# Patient Record
Sex: MALE | Race: WHITE | URBAN - METROPOLITAN AREA
[De-identification: names, ages, dates, MRNs, and addresses within clinical notes are randomized per-mention and may not be internally consistent; named-entity substitution may affect disease eponyms.]

---

## 2019-06-04 ENCOUNTER — IMPORTED ENCOUNTER (OUTPATIENT)
Dept: URBAN - METROPOLITAN AREA CLINIC 50 | Facility: CLINIC | Age: 79
End: 2019-06-04

## 2019-06-06 ENCOUNTER — IMPORTED ENCOUNTER (OUTPATIENT)
Dept: URBAN - METROPOLITAN AREA CLINIC 50 | Facility: CLINIC | Age: 79
End: 2019-06-06

## 2019-08-06 ENCOUNTER — IMPORTED ENCOUNTER (OUTPATIENT)
Dept: URBAN - METROPOLITAN AREA CLINIC 50 | Facility: CLINIC | Age: 79
End: 2019-08-06

## 2019-08-09 ENCOUNTER — IMPORTED ENCOUNTER (OUTPATIENT)
Dept: URBAN - METROPOLITAN AREA CLINIC 50 | Facility: CLINIC | Age: 79
End: 2019-08-09

## 2019-08-09 NOTE — PATIENT DISCUSSION
"""[place]aco with IOL OD: 08/15/2019 Sensar AAB00 20.5 [place] [facility] [place] [facility] [place]

## 2019-08-15 ENCOUNTER — IMPORTED ENCOUNTER (OUTPATIENT)
Dept: URBAN - METROPOLITAN AREA CLINIC 50 | Facility: CLINIC | Age: 79
End: 2019-08-15

## 2019-08-15 NOTE — PATIENT DISCUSSION
"""S/P IOL OD: Sensar AAB00 20.5 (Target: [place]) +Omidria. Continue post operative instructions and drops per schedule.  """

## 2019-08-21 ENCOUNTER — IMPORTED ENCOUNTER (OUTPATIENT)
Dept: URBAN - METROPOLITAN AREA CLINIC 50 | Facility: CLINIC | Age: 79
End: 2019-08-21

## 2019-08-21 NOTE — PATIENT DISCUSSION
"""S/P IOL OD: Sensar AAB00 20.5 (Target: [place]) +Omidria. Continue post operative instructions and drops per schedule.  """

## 2019-08-30 ENCOUNTER — IMPORTED ENCOUNTER (OUTPATIENT)
Dept: URBAN - METROPOLITAN AREA CLINIC 50 | Facility: CLINIC | Age: 79
End: 2019-08-30

## 2019-08-30 NOTE — PATIENT DISCUSSION
"""S/P IOL OS: Sensar AAB00 20.5 (Target: [place]) +Omidria. Continue post operative instructions and drops per schedule.  """

## 2019-09-17 ENCOUNTER — IMPORTED ENCOUNTER (OUTPATIENT)
Dept: URBAN - METROPOLITAN AREA CLINIC 50 | Facility: CLINIC | Age: 79
End: 2019-09-17

## 2019-09-17 NOTE — PATIENT DISCUSSION
"""S/P IOL OS: Sensar AAB00 20.5 (Target: [place]) +Omidria. Continue post operative instructions and drops per schedule.  """

## 2019-09-25 NOTE — PATIENT DISCUSSION
Monitor for changes. Updated CLRx given today. Advised patient to call our office with decreased vision or increased symptoms.

## 2019-09-25 NOTE — PATIENT DISCUSSION
TYPE OF SURGERY: BULB  I counseled the patient regarding the following:Blepharoplasty: Bilateral upper blepharoplasty discussed with patient.  Visual Fields and Photos were done today to demonstrate the visual impairment related to the visual complaint of the patient. R/B/A to surgery discussed with patient, including, but not limited to: scar, pain, bleeding, infection, loss of vision, double vision, asymmetry, over correction, under correction, poor cosmetic outcome, difficulty closing eyes, dry eye, dependence on lubricating drops postoperatively. Patient understands brow ptosis composes part of upper lid ptosis. Patient understands R/B/A and would like to proceed with surgery in the near future.

## 2019-09-25 NOTE — PATIENT DISCUSSION
Monitor for IOP and NFL changes with visits, OCTs, HVFs. Advised IOP stable off drops. Will monitor closely for any increase in IOP.

## 2019-10-04 ENCOUNTER — IMPORTED ENCOUNTER (OUTPATIENT)
Dept: URBAN - METROPOLITAN AREA CLINIC 50 | Facility: CLINIC | Age: 79
End: 2019-10-04

## 2019-10-04 NOTE — PATIENT DISCUSSION
"""S/P IOL OU: OD: Sensar AAB00 20.5 (Target: [place])Omidria.  OS: Sensar AAB00 20.5 (Target: ""

## 2021-04-17 ASSESSMENT — TONOMETRY
OD_IOP_MMHG: 17
OD_IOP_MMHG: 9
OS_IOP_MMHG: 18
OS_IOP_MMHG: 17
OD_IOP_MMHG: 20
OS_IOP_MMHG: 17
OD_IOP_MMHG: 19
OD_IOP_MMHG: 17
OD_IOP_MMHG: 10
OS_IOP_MMHG: 17
OS_IOP_MMHG: 14
OD_IOP_MMHG: 18
OD_IOP_MMHG: 17
OS_IOP_MMHG: 16
OS_IOP_MMHG: 18
OS_IOP_MMHG: 10
OS_IOP_MMHG: 18
OS_IOP_MMHG: 15
OS_IOP_MMHG: 9
OD_IOP_MMHG: 16
OD_IOP_MMHG: 16
OD_IOP_MMHG: 15

## 2021-04-17 ASSESSMENT — PACHYMETRY
OS_CT_UM: 568
OD_CT_UM: 558
OS_CT_UM: 568
OD_CT_UM: 558
OD_CT_UM: 558
OS_CT_UM: 568
OD_CT_UM: 558
OS_CT_UM: 568
OS_CT_UM: 568
OD_CT_UM: 558
OS_CT_UM: 568
OD_CT_UM: 558

## 2021-04-17 ASSESSMENT — VISUAL ACUITY
OS_SC: 20/25+1
OS_BAT: 20/40
OS_PH: 20/40-1
OD_BAT: 20/40
OD_CC: J1+
OS_BAT: 20/40
OD_OTHER: 20/40. 20/70.
OS_CC: 20/30
OD_CC: 20/40+1
OS_OTHER: 20/40. 20/40.
OD_SC: 20/25-1
OD_BAT: 20/40
OD_SC: 20/30-2
OD_SC: 20/30
OS_CC: J1+
OS_BAT: 20/40
OS_OTHER: 20/40. 20/40.
OS_SC: 20/60
OD_CC: 20/30-1
OD_SC: 20/30
OS_SC: 20/25
OD_CC: J1+
OS_CC: J1+
OS_CC: 20/40
OS_CC: J1+
OS_CC: 20/50
OD_CC: J1+
OD_OTHER: 20/40. 20/70.
OS_OTHER: 20/40. 20/70.

## 2022-03-25 NOTE — PATIENT DISCUSSION
RTC in 1 year for Exam, Gonio (BEFORE DILATION), and OCT ONH with LEIA, sooner if problems or changes.

## 2022-03-25 NOTE — PATIENT DISCUSSION
Explained MAURIZIO and recommended regular use of ATs 3-4 times per day and increase prn.  Recommended gel qhs.